# Patient Record
Sex: MALE
[De-identification: names, ages, dates, MRNs, and addresses within clinical notes are randomized per-mention and may not be internally consistent; named-entity substitution may affect disease eponyms.]

---

## 2020-07-13 ENCOUNTER — APPOINTMENT (OUTPATIENT)
Dept: PLASTIC SURGERY | Facility: CLINIC | Age: 35
End: 2020-07-13
Payer: MEDICAID

## 2020-07-13 VITALS
HEIGHT: 68 IN | DIASTOLIC BLOOD PRESSURE: 77 MMHG | SYSTOLIC BLOOD PRESSURE: 110 MMHG | HEART RATE: 83 BPM | BODY MASS INDEX: 23.34 KG/M2 | WEIGHT: 154 LBS

## 2020-07-13 DIAGNOSIS — Z78.9 OTHER SPECIFIED HEALTH STATUS: ICD-10-CM

## 2020-07-13 DIAGNOSIS — Z63.5 DISRUPTION OF FAMILY BY SEPARATION AND DIVORCE: ICD-10-CM

## 2020-07-13 PROBLEM — Z00.00 ENCOUNTER FOR PREVENTIVE HEALTH EXAMINATION: Status: ACTIVE | Noted: 2020-07-13

## 2020-07-13 PROCEDURE — 99203 OFFICE O/P NEW LOW 30 MIN: CPT

## 2020-07-13 SDOH — SOCIAL STABILITY - SOCIAL INSECURITY: DISRUPTION OF FAMILY BY SEPARATION AND DIVORCE: Z63.5

## 2020-07-16 PROBLEM — Z78.9 NO PERTINENT PAST MEDICAL HISTORY: Status: RESOLVED | Noted: 2020-07-13 | Resolved: 2020-07-16

## 2020-07-16 PROBLEM — Z78.9 DOES NOT USE ILLICIT DRUGS: Status: ACTIVE | Noted: 2020-07-13

## 2020-07-16 PROBLEM — Z63.5 DIVORCED: Status: ACTIVE | Noted: 2020-07-13

## 2020-07-16 RX ORDER — CA/D3/MAG OX/ZINC/COP/MANG/BOR 600 MG-800
250 MCG TABLET,CHEWABLE ORAL
Refills: 0 | Status: ACTIVE | COMMUNITY

## 2020-07-16 RX ORDER — CIDER VINEGAR 300 MG
1000 TABLET ORAL
Refills: 0 | Status: ACTIVE | COMMUNITY

## 2020-07-16 RX ORDER — ELECTROLYTES/DEXTROSE
SOLUTION, ORAL ORAL
Refills: 0 | Status: ACTIVE | COMMUNITY

## 2020-07-16 NOTE — REASON FOR VISIT
[Initial Evaluation] : an initial evaluation [Spouse] : spouse [FreeTextEntry2] : Narcisa [FreeTextEntry1] : [id number] [FreeTextEntry3] : Melecio [TWNoteComboBox1] : Chinese

## 2020-07-16 NOTE — ASSESSMENT
[FreeTextEntry1] : scalp mass without evidence of infection/inflammation. lipoma vs pilar cyst. plan for excision in office.

## 2020-09-25 ENCOUNTER — LABORATORY RESULT (OUTPATIENT)
Age: 35
End: 2020-09-25

## 2020-09-25 ENCOUNTER — APPOINTMENT (OUTPATIENT)
Dept: PLASTIC SURGERY | Facility: CLINIC | Age: 35
End: 2020-09-25
Payer: MEDICAID

## 2020-09-25 PROCEDURE — 21013 EXC FACE TUM DEEP < 2 CM: CPT

## 2020-09-25 NOTE — PROCEDURE
[Nl] : None [FreeTextEntry1] : right scalp mass [FreeTextEntry2] : excision of right scalp subgaleal lipoma [FreeTextEntry6] : Risks , benefits, and alternatives to excision of the mass were discussed with the patient. Specific risks of bleeding, infection, hematoma, seroma, recurrence, damage to nearby structures, permanent scarring, chronic pain, and need for additional procedures, among other risks, were discussed the patient and all questions were answered. The patient consented to the procedure.\par \par The right scalp was prepped with povidine iodine and local block performed with 1% lidocaine with epinephrine buffered 5:1 with bicarbonate 8.6%. A total of 5 cc were infiltrated. After anesthesia of the area was confirmed, a linear incision was made over the mass. Hemostasis was obtained with thermal cautery and pressure. The mass was noted to be subgaleal and ~1 cm in diameter. Blunt and sharp dissection was performed to free the mass from the surrounding tissue. \par \par The mass was oriented with suture. A suture with 2 long tails was placed laterally, a suture with 2 short tails was placed superiorly, and a suture with discordant tail length was placed on the deep side of the specimen \par \par The wound was irrigated, closed in layers, and bacitracin applied.\par  [FreeTextEntry7] : subgaleal lipoma

## 2020-09-25 NOTE — REASON FOR VISIT
[Procedure: _________] : a [unfilled] procedure visit [Family Member] : family member [FreeTextEntry1] : ex of Right scalp mass.

## 2020-09-30 ENCOUNTER — APPOINTMENT (OUTPATIENT)
Dept: PLASTIC SURGERY | Facility: CLINIC | Age: 35
End: 2020-09-30
Payer: MEDICAID

## 2020-09-30 PROCEDURE — 99024 POSTOP FOLLOW-UP VISIT: CPT

## 2020-10-05 NOTE — PHYSICAL EXAM
[de-identified] : incision intact. mild fullness, no erythema, no ecchymoses. nylon suture removed.

## 2020-10-05 NOTE — HISTORY OF PRESENT ILLNESS
[FreeTextEntry1] : s/p excision of subgaleal lipoma 5 days ago. Denies issues at site.\par \par path: lipoma

## 2020-10-05 NOTE — REASON FOR VISIT
[Spouse] : spouse [Post Op: _________] : a [unfilled] post op visit [FreeTextEntry1] : DOP 09/25/20 s/p excisional biopsy and closure of right scalp mass.

## 2020-10-07 ENCOUNTER — APPOINTMENT (OUTPATIENT)
Dept: PLASTIC SURGERY | Facility: CLINIC | Age: 35
End: 2020-10-07
Payer: MEDICAID

## 2020-10-07 DIAGNOSIS — R22.0 LOCALIZED SWELLING, MASS AND LUMP, HEAD: ICD-10-CM

## 2020-10-07 PROCEDURE — 99024 POSTOP FOLLOW-UP VISIT: CPT

## 2020-10-13 PROBLEM — R22.0 SCALP MASS: Status: RESOLVED | Noted: 2020-07-16 | Resolved: 2020-10-13

## 2020-10-13 NOTE — REASON FOR VISIT
[Post Op: _________] : a [unfilled] post op visit [Spouse] : spouse [FreeTextEntry1] : DOP 09/25/20 s/p excisional biopsy and closure of right scalp mass. \par

## 2021-12-21 ENCOUNTER — APPOINTMENT (OUTPATIENT)
Dept: UROLOGY | Facility: CLINIC | Age: 36
End: 2021-12-21

## 2023-06-20 ENCOUNTER — APPOINTMENT (OUTPATIENT)
Dept: UROLOGY | Facility: CLINIC | Age: 38
End: 2023-06-20